# Patient Record
Sex: MALE | Race: WHITE | Employment: FULL TIME | URBAN - METROPOLITAN AREA
[De-identification: names, ages, dates, MRNs, and addresses within clinical notes are randomized per-mention and may not be internally consistent; named-entity substitution may affect disease eponyms.]

---

## 2021-02-18 ENCOUNTER — HOSPITAL ENCOUNTER (OUTPATIENT)
Dept: GENERAL RADIOLOGY | Age: 46
Discharge: HOME | End: 2021-02-20
Payer: COMMERCIAL

## 2021-02-18 ENCOUNTER — HOSPITAL ENCOUNTER (OUTPATIENT)
Age: 46
Discharge: HOME | End: 2021-02-20
Payer: COMMERCIAL

## 2021-02-18 DIAGNOSIS — M25.551 RIGHT HIP PAIN: ICD-10-CM

## 2021-02-18 PROCEDURE — 73552 X-RAY EXAM OF FEMUR 2/>: CPT

## 2021-02-18 PROCEDURE — 73502 X-RAY EXAM HIP UNI 2-3 VIEWS: CPT

## 2021-10-11 ENCOUNTER — OFFICE VISIT (OUTPATIENT)
Dept: ENT CLINIC | Age: 46
End: 2021-10-11
Payer: COMMERCIAL

## 2021-10-11 VITALS — WEIGHT: 222 LBS | HEIGHT: 70 IN | BODY MASS INDEX: 31.78 KG/M2

## 2021-10-11 DIAGNOSIS — Q18.0 CONGENITAL BRANCHIAL CLEFT CYST: Primary | ICD-10-CM

## 2021-10-11 PROCEDURE — 99204 OFFICE O/P NEW MOD 45 MIN: CPT | Performed by: OTOLARYNGOLOGY

## 2021-10-11 RX ORDER — ATORVASTATIN CALCIUM 40 MG/1
TABLET, FILM COATED ORAL
COMMUNITY
Start: 2021-09-24

## 2021-10-11 RX ORDER — METOPROLOL SUCCINATE 50 MG/1
TABLET, EXTENDED RELEASE ORAL
COMMUNITY
Start: 2021-09-01

## 2021-10-11 ASSESSMENT — ENCOUNTER SYMPTOMS
SORE THROAT: 0
VOMITING: 0
CHOKING: 0
TROUBLE SWALLOWING: 0
VOICE CHANGE: 0
SHORTNESS OF BREATH: 0
COUGH: 0

## 2021-10-11 NOTE — PATIENT INSTRUCTIONS
Please obtain CT disk from [facility]. You may bring with you the day of surgery or drop it off at the office. Due to the volume of surgeries at our practice, please allow the surgery scheduler up to 2 weeks to call you to schedule surgery. SURGERY:_____/_____/_____    Nothing to eat or drink after midnight the night before surgery unless surgery is at [facility] or otherwise instructed by the hospital.    DO NOT TAKE ANY ASPIRIN PRODUCTS 7 days prior to surgery. Tylenol only. No Advil, Motrin, Aleve, or Ibuprofen. Any illegal drugs in your system (including Marijuana even if legally prescribed) will result in your surgery being cancelled. Please be sure to check with our office or the hospital on time frame for the drugs to be out of your system. SHOULD YOUR INSURANCE CHANGE AT ANY TIME YOU MUST CONTACT OUR OFFICE. FAILURE TO DO SO MAY RESULT IN YOUR SURGERY BEING RESCHEDULED OR YOU MAY BE CHARGED AS SELF-PAY. Due to the high demand for surgery at our practice, if you cancel or reschedule your surgery two (2) times we may not reschedule you. If you need FMLA or Short Term Disability paperwork completed for your surgery, please complete your portion, ensure your name and date of birth are on them and fax them to [phone] asap. Paperwork can take up to 2 weeks to be completed. Per current hospital protocols, you will be contacted within 1 week of your surgery date with instructions to complete COVID-19 testing. COVID testing is no longer required as long as you are FULLY vaccinated (14 days AFTER 2nd vaccination). You must present your vaccination card at time of surgery, failure to do so will prompt a rapid COVID test prior to your surgery. If you need medical clearance, you are responsible to contact your physician(s) to schedule the appointment. If clearance is not completed within 30 days of your surgery it may be cancelled.  Our office will fax the appropriate forms that need to be completed to your physician(s). The location of your surgery will call you the day prior to your surgery date to let you know what time you have to be there and any other details.     ·       [address] , [facility] will call you a couple days prior to surgery and give you further instructions, if you have any questions, you can reach them at [phone]    ·       [address], [address], [facility], [facility] will call you a couple days prior to surgery and give you further instructions, if you have any questions, you can reach them at [phone]

## 2021-10-11 NOTE — PROGRESS NOTES
[address] [facility] Otolaryngology  Dr. Cindi Ott. ALEXANDRE Brown Ms.Ed. New Consult       Patient Name:  Gege Cordero  :  1975     CHIEF C/O:    Chief Complaint   Patient presents with    New Patient     patient was seen at [facility] ED and there is a lump in his throat, states there is no choking, coughing, or issues swallowing. states has been there apx 10 years        HISTORY OBTAINED FROM:  patient    HISTORY OF PRESENT ILLNESS:       Sandy Gutierrez is a 42y.o. year old male, here today for left sided neck mass evaluated by pcp had ct at [facility] for here for evaluation. Patient denies any significant changes recently, has noticed it for several years greater than 10, but is become slightly enlarged recently, no overlying redness no overlying difficulty swallowing. No current complaints of voice change shortness of breath or stridor. No other new complaints today of nasal congestion epistaxis fever chills sore throat no prior history of any neck surgeries. No other complaint today of tinnitus vertigo or hearing loss. Past Medical History:   Diagnosis Date    Arthralgia of hip     congenital hip issues    Family history of cardiovascular disease     Hyperlipidemia     Hypertension      Past Surgical History:   Procedure Laterality Date    HIP SURGERY Left     resurfacing- [facility]    TYMPANOSTOMY TUBE PLACEMENT      WISDOM TOOTH EXTRACTION         Current Outpatient Medications:     metoprolol succinate (TOPROL XL) 50 MG extended release tablet, TAKE 1 TABLET BY MOUTH EVERY DAY, Disp: , Rfl:     atorvastatin (LIPITOR) 40 MG tablet, TAKE 1 TABLET BY MOUTH EVERY DAY, Disp: , Rfl:     diltiazem (CARDIZEM CD) 180 MG extended release capsule, Take 1 capsule by mouth daily, Disp: 90 capsule, Rfl: 3    clotrimazole-betamethasone (LOTRISONE) 1-0.05 % cream, Apply topically 2 times daily.  (Patient not taking: Reported on 10/11/2021), Disp: 45 g, Rfl: 0    diltiazem (CARDIZEM CD) 180 MG ER capsule, Take 1 capsule by mouth daily, Disp: 30 capsule, Rfl: 3  Ace inhibitors  Social History     Tobacco Use    Smoking status: Never Smoker    Smokeless tobacco: Never Used    Tobacco comment: caffeine-  cups daily   Substance Use Topics    Alcohol use: Yes     Comment: occ.  Drug use: No     Family History   Problem Relation Age of Onset    Diabetes Mother     Stroke Mother     Diabetes Father        Review of Systems   Constitutional: Negative for chills and fever. HENT: Negative for ear discharge, hearing loss, sore throat, trouble swallowing and voice change. Respiratory: Negative for cough, choking and shortness of breath. Cardiovascular: Negative for chest pain and palpitations. Gastrointestinal: Negative for vomiting. Skin: Negative for rash. Allergic/Immunologic: Negative for environmental allergies. Neurological: Negative for dizziness and headaches. Hematological: Does not bruise/bleed easily. All other systems reviewed and are negative. Ht 5' 10\" (1.778 m)   Wt 222 lb (100.7 kg)   BMI 31.85 kg/m²   Physical Exam  Vitals and nursing note reviewed. Constitutional:       Appearance: He is well-developed. HENT:      Head: No contusion, masses or laceration. Jaw: No tenderness or swelling. Right Ear: Tympanic membrane, ear canal and external ear normal. No decreased hearing noted. No drainage. There is no impacted cerumen. Left Ear: Tympanic membrane, ear canal and external ear normal. No decreased hearing noted. No drainage. There is no impacted cerumen. Nose: Nose normal. No septal deviation, congestion or rhinorrhea. Right Nostril: No epistaxis. Left Nostril: No epistaxis. Right Turbinates: Not enlarged. Left Turbinates: Not enlarged. Mouth/Throat:      Mouth: Mucous membranes are moist. No oral lesions. Dentition: No gum lesions. Tongue: No lesions.       Pharynx: No oropharyngeal exudate or posterior oropharyngeal erythema. Eyes:      Pupils: Pupils are equal, round, and reactive to light. Neck:      Thyroid: No thyromegaly. Trachea: No tracheal deviation. Cardiovascular:      Rate and Rhythm: Normal rate. Pulmonary:      Effort: Pulmonary effort is normal. No respiratory distress. Musculoskeletal:         General: Normal range of motion. Cervical back: Normal range of motion. Lymphadenopathy:      Cervical: No cervical adenopathy. Skin:     General: Skin is warm. Findings: No erythema. Neurological:      Mental Status: He is alert. Cranial Nerves: No cranial nerve deficit. 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 Risk include infection scarring need for future surgery bleeding as well. Follow up 1 week post op    Dr. Baron Mimi Florse Otolaryngology/Facial Plastic Surgery Residency  Associate Clinical Professor:  Wyona Severe, [facility]

## 2021-10-14 ENCOUNTER — TELEPHONE (OUTPATIENT)
Dept: ENT CLINIC | Age: 46
End: 2021-10-14

## 2021-10-21 ENCOUNTER — TELEPHONE (OUTPATIENT)
Dept: ENT CLINIC | Age: 46
End: 2021-10-21

## 2021-12-23 ENCOUNTER — HOSPITAL ENCOUNTER (OUTPATIENT)
Dept: PREADMISSION TESTING | Age: 46
Discharge: HOME | End: 2021-12-23
Payer: COMMERCIAL

## 2021-12-23 LAB
ANION GAP SERPL CALCULATED.3IONS-SCNC: 12 MMOL/L (ref 7–16)
BUN BLDV-MCNC: 15 MG/DL (ref 6–20)
CALCIUM SERPL-MCNC: 9.5 MG/DL (ref 8.6–10.2)
CHLORIDE BLD-SCNC: 102 MMOL/L (ref 98–107)
CO2: 25 MMOL/L (ref 22–29)
CREAT SERPL-MCNC: 0.9 MG/DL (ref 0.7–1.2)
EKG ATRIAL RATE: 67 BPM
EKG P AXIS: 20 DEGREES
EKG P-R INTERVAL: 152 MS
EKG Q-T INTERVAL: 408 MS
EKG QRS DURATION: 102 MS
EKG QTC CALCULATION (BAZETT): 431 MS
EKG R AXIS: 35 DEGREES
EKG T AXIS: -8 DEGREES
EKG VENTRICULAR RATE: 67 BPM
GFR AFRICAN AMERICAN: >60
GFR NON-AFRICAN AMERICAN: >60 ML/MIN/1.73
GLUCOSE BLD-MCNC: 113 MG/DL (ref 74–99)
POTASSIUM SERPL-SCNC: 4.3 MMOL/L (ref 3.5–5)
SODIUM BLD-SCNC: 139 MMOL/L (ref 132–146)

## 2021-12-23 PROCEDURE — 80048 BASIC METABOLIC PNL TOTAL CA: CPT

## 2021-12-23 PROCEDURE — 93005 ELECTROCARDIOGRAM TRACING: CPT

## 2021-12-23 PROCEDURE — 36415 COLL VENOUS BLD VENIPUNCTURE: CPT

## 2021-12-23 RX ORDER — OLMESARTAN MEDOXOMIL AND HYDROCHLOROTHIAZIDE 20/12.5 20; 12.5 MG/1; MG/1
TABLET ORAL
COMMUNITY
Start: 2021-03-17

## 2021-12-23 RX ORDER — ACETAMINOPHEN 500 MG
1000 TABLET ORAL EVERY 8 HOURS
COMMUNITY
Start: 2021-08-20

## 2021-12-23 NOTE — PROGRESS NOTES
[facility] PRE-ADMISSION TESTING INSTRUCTIONS    The Preadmission Testing patient is instructed accordingly using the following criteria (check applicable):    ARRIVAL INSTRUCTIONS:  [x] Parking the day of Surgery is located in the Main Entrance lot. Upon entering the door, make an immediate right to the surgery reception desk    [x] Bring photo ID and insurance card    [] Bring in a copy of Living will or Durable Power of  papers. [x] Please be sure to arrange for responsible adult to provide transportation to and from the hospital    [x] Please arrange for responsible adult to be with you for the 24 hour period post procedure due to having anesthesia      GENERAL INSTRUCTIONS:    [x] Nothing by mouth after midnight, including gum, candy, mints or water    [x] You may brush your teeth, but do not swallow any water    [x] Take medications as instructed with 1-2 oz of water    [] Stop herbal supplements and vitamins 5 days prior to procedure    [] Follow preop dosing of blood thinners per physician instructions    [] Take 1/2 dose of evening insulin, but no insulin after midnight    [] No oral diabetic medications after midnight    [] If diabetic and have low blood sugar or feel symptomatic, take 1-2oz apple juice only    [] Bring inhalers day of surgery    [] Bring C-PAP/ Bi-Pap day of surgery    [] Bring urine specimen day of surgery    [x] Shower or bath with soap, lather and rinse well, AM of Surgery, no lotion, powders or creams to surgical site    [] Follow bowel prep as instructed per surgeon    [] No tobacco products within 24 hours of surgery     [] No alcohol or illegal drug use within 24 hours of surgery.     [x] Jewelry, body piercing's, eyeglasses, contact lenses and dentures are not permitted into surgery (bring cases)      [] Please do not wear any nail polish, make up or hair products on the day of surgery    [x] You can expect a call the business day prior to procedure to notify you if your arrival time changes    [x] If you receive a survey after surgery we would greatly appreciate your comments    [] Parent/guardian of a minor must accompany their child and remain on the premises  the entire time they are under our care     [] Pediatric patients may bring favorite toy, blanket or comfort item with them    [] A caregiver or family member must remain with the patient during their stay if they are mentally handicapped, have dementia, disoriented or unable to use a call light or would be a safety concern if left unattended    [x] Please notify surgeon if you develop any illness between now and time of surgery (cold, cough, sore throat, fever, nausea, vomiting) or any signs of infections  including skin, wounds, and dental.    [x]  The Outpatient Pharmacy is available to fill your prescription here on your day of surgery, ask your preop nurse for details    [] Other instructions    EDUCATIONAL MATERIALS PROVIDED:    [] PAT Preoperative Education Packet/Booklet     [] Medication List    [] Transfusion bracelet applied with instructions    [] Shower with soap, lather and rinse well, and use CHG wipes provided the evening before surgery as instructed    [] Incentive spirometer with instructions        Have you been tested for COVID  No           Have you been told you were positive for COVID No  Have you had any known exposure to someone that is positive for COVID No  Do you have a cough                   No              Do you have shortness of breath No                 Do you have a sore throat            No                Are you having chills                    No                Are you having muscle aches. No                    Please come to the hospital wearing a mask and have your significant other wear a mask as well. Both of you should check your temperature before leaving to come here,  if it is 100 or higher please call [phone] for instruction.

## 2021-12-26 NOTE — H&P
Patient Name:  Colby Cabrales  :  1975      CHIEF C/O:         Chief Complaint   Patient presents with    New Patient       patient was seen at [facility] and there is a lump in his throat, states there is no choking, coughing, or issues swallowing. states has been there apx 10 years          HISTORY OBTAINED FROM:  patient     HISTORY OF PRESENT ILLNESS:       Rosetta Runner is a 42y.o. year old male, here today for left sided neck mass evaluated by pcp had ct at [facility] for here for evaluation. Patient denies any significant changes recently, has noticed it for several years greater than 10, but is become slightly enlarged recently, no overlying redness no overlying difficulty swallowing. No current complaints of voice change shortness of breath or stridor. No other new complaints today of nasal congestion epistaxis fever chills sore throat no prior history of any neck surgeries. No other complaint today of tinnitus vertigo or hearing loss.     Past Medical History        Past Medical History:   Diagnosis Date    Arthralgia of hip       congenital hip issues    Family history of cardiovascular disease      Hyperlipidemia      Hypertension           Past Surgical History         Past Surgical History:   Procedure Laterality Date    HIP SURGERY Left      resurfacing- [facility]    TYMPANOSTOMY TUBE PLACEMENT        WISDOM TOOTH EXTRACTION               Current Medication      Current Outpatient Medications:     metoprolol succinate (TOPROL XL) 50 MG extended release tablet, TAKE 1 TABLET BY MOUTH EVERY DAY, Disp: , Rfl:     atorvastatin (LIPITOR) 40 MG tablet, TAKE 1 TABLET BY MOUTH EVERY DAY, Disp: , Rfl:     diltiazem (CARDIZEM CD) 180 MG extended release capsule, Take 1 capsule by mouth daily, Disp: 90 capsule, Rfl: 3    clotrimazole-betamethasone (LOTRISONE) 1-0.05 % cream, Apply topically 2 times daily.  (Patient not taking: Reported on 10/11/2021), Disp: 45 g, Rfl: 0   diltiazem (CARDIZEM CD) 180 MG ER capsule, Take 1 capsule by mouth daily, Disp: 30 capsule, Rfl: 3     Ace inhibitors  Social History            Tobacco Use    Smoking status: Never Smoker    Smokeless tobacco: Never Used    Tobacco comment: caffeine-  cups daily   Substance Use Topics    Alcohol use: Yes       Comment: occ.  Drug use: No      Family History         Family History   Problem Relation Age of Onset    Diabetes Mother      Stroke Mother      Diabetes Father              Review of Systems   Constitutional: Negative for chills and fever. HENT: Negative for ear discharge, hearing loss, sore throat, trouble swallowing and voice change. Respiratory: Negative for cough, choking and shortness of breath. Cardiovascular: Negative for chest pain and palpitations. Gastrointestinal: Negative for vomiting. Skin: Negative for rash. Allergic/Immunologic: Negative for environmental allergies. Neurological: Negative for dizziness and headaches. Hematological: Does not bruise/bleed easily. All other systems reviewed and are negative.        Ht 5' 10\" (1.778 m)   Wt 222 lb (100.7 kg)   BMI 31.85 kg/m²   Physical Exam  Vitals and nursing note reviewed. Constitutional:       Appearance: He is well-developed. HENT:      Head: No contusion, masses or laceration. Jaw: No tenderness or swelling. Right Ear: Tympanic membrane, ear canal and external ear normal. No decreased hearing noted. No drainage. There is no impacted cerumen. Left Ear: Tympanic membrane, ear canal and external ear normal. No decreased hearing noted. No drainage. There is no impacted cerumen. Nose: Nose normal. No septal deviation, congestion or rhinorrhea. Right Nostril: No epistaxis. Left Nostril: No epistaxis. Right Turbinates: Not enlarged. Left Turbinates: Not enlarged. Mouth/Throat:      Mouth: Mucous membranes are moist. No oral lesions. Dentition: No gum lesions. Tongue: No lesions. Pharynx: No oropharyngeal exudate or posterior oropharyngeal erythema. Eyes:      Pupils: Pupils are equal, round, and reactive to light. Neck:      Thyroid: No thyromegaly. Trachea: No tracheal deviation. Cardiovascular:      Rate and Rhythm: Normal rate. Pulmonary:      Effort: Pulmonary effort is normal. No respiratory distress. Musculoskeletal:         General: Normal range of motion. Cervical back: Normal range of motion. Lymphadenopathy:      Cervical: No cervical adenopathy. Skin:     General: Skin is warm. Findings: No erythema. Neurological:      Mental Status: He is alert. Cranial Nerves: No cranial nerve deficit.          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 Risk include infection scarring need for future surgery bleeding as well.   Follow up 1 week post op    Electronically signed by Karen Gonzalez DO on 12/26/2021 at 9:47 AM

## 2021-12-28 ENCOUNTER — ANESTHESIA EVENT (OUTPATIENT)
Dept: OPERATING ROOM | Age: 46
End: 2021-12-28
Payer: COMMERCIAL

## 2021-12-28 ENCOUNTER — HOSPITAL ENCOUNTER (OUTPATIENT)
Age: 46
Setting detail: OUTPATIENT SURGERY
Discharge: HOME | End: 2021-12-28
Attending: OTOLARYNGOLOGY | Admitting: OTOLARYNGOLOGY
Payer: COMMERCIAL

## 2021-12-28 ENCOUNTER — ANESTHESIA (OUTPATIENT)
Dept: OPERATING ROOM | Age: 46
End: 2021-12-28
Payer: COMMERCIAL

## 2021-12-28 VITALS
BODY MASS INDEX: 37.22 KG/M2 | RESPIRATION RATE: 16 BRPM | WEIGHT: 260 LBS | HEART RATE: 86 BPM | HEIGHT: 70 IN | OXYGEN SATURATION: 93 % | DIASTOLIC BLOOD PRESSURE: 57 MMHG | TEMPERATURE: 98 F | SYSTOLIC BLOOD PRESSURE: 121 MMHG

## 2021-12-28 VITALS — DIASTOLIC BLOOD PRESSURE: 52 MMHG | SYSTOLIC BLOOD PRESSURE: 97 MMHG | TEMPERATURE: 66.6 F | OXYGEN SATURATION: 99 %

## 2021-12-28 DIAGNOSIS — G89.18 POST-OP PAIN: Primary | ICD-10-CM

## 2021-12-28 PROCEDURE — 7100000000 HC PACU RECOVERY - FIRST 15 MIN: Performed by: OTOLARYNGOLOGY

## 2021-12-28 PROCEDURE — 42815 EXCISION OF NECK CYST: CPT | Performed by: OTOLARYNGOLOGY

## 2021-12-28 PROCEDURE — 7100000001 HC PACU RECOVERY - ADDTL 15 MIN: Performed by: OTOLARYNGOLOGY

## 2021-12-28 PROCEDURE — 2580000003 HC RX 258: Performed by: NURSE ANESTHETIST, CERTIFIED REGISTERED

## 2021-12-28 PROCEDURE — 3600000002 HC SURGERY LEVEL 2 BASE: Performed by: OTOLARYNGOLOGY

## 2021-12-28 PROCEDURE — 2500000003 HC RX 250 WO HCPCS: Performed by: NURSE ANESTHETIST, CERTIFIED REGISTERED

## 2021-12-28 PROCEDURE — 7100000010 HC PHASE II RECOVERY - FIRST 15 MIN: Performed by: OTOLARYNGOLOGY

## 2021-12-28 PROCEDURE — 2500000003 HC RX 250 WO HCPCS: Performed by: OTOLARYNGOLOGY

## 2021-12-28 PROCEDURE — 6360000002 HC RX W HCPCS: Performed by: NURSE ANESTHETIST, CERTIFIED REGISTERED

## 2021-12-28 PROCEDURE — 3700000000 HC ANESTHESIA ATTENDED CARE: Performed by: OTOLARYNGOLOGY

## 2021-12-28 PROCEDURE — 2709999900 HC NON-CHARGEABLE SUPPLY: Performed by: OTOLARYNGOLOGY

## 2021-12-28 PROCEDURE — 2580000003 HC RX 258: Performed by: STUDENT IN AN ORGANIZED HEALTH CARE EDUCATION/TRAINING PROGRAM

## 2021-12-28 PROCEDURE — 88304 TISSUE EXAM BY PATHOLOGIST: CPT

## 2021-12-28 PROCEDURE — 3600000012 HC SURGERY LEVEL 2 ADDTL 15MIN: Performed by: OTOLARYNGOLOGY

## 2021-12-28 PROCEDURE — 6360000002 HC RX W HCPCS: Performed by: ANESTHESIOLOGY

## 2021-12-28 PROCEDURE — 7100000011 HC PHASE II RECOVERY - ADDTL 15 MIN: Performed by: OTOLARYNGOLOGY

## 2021-12-28 PROCEDURE — 6360000002 HC RX W HCPCS: Performed by: STUDENT IN AN ORGANIZED HEALTH CARE EDUCATION/TRAINING PROGRAM

## 2021-12-28 PROCEDURE — 3700000001 HC ADD 15 MINUTES (ANESTHESIA): Performed by: OTOLARYNGOLOGY

## 2021-12-28 RX ORDER — SODIUM CHLORIDE 0.9 % (FLUSH) 0.9 %
5-40 SYRINGE (ML) INJECTION PRN
Status: DISCONTINUED | OUTPATIENT
Start: 2021-12-28 | End: 2021-12-28 | Stop reason: HOSPADM

## 2021-12-28 RX ORDER — HYDROCODONE BITARTRATE AND ACETAMINOPHEN 5; 325 MG/1; MG/1
1 TABLET ORAL EVERY 6 HOURS PRN
Qty: 12 TABLET | Refills: 0 | Status: SHIPPED | OUTPATIENT
Start: 2021-12-28 | End: 2021-12-31

## 2021-12-28 RX ORDER — PROPOFOL 10 MG/ML
INJECTION, EMULSION INTRAVENOUS PRN
Status: DISCONTINUED | OUTPATIENT
Start: 2021-12-28 | End: 2021-12-28 | Stop reason: SDUPTHER

## 2021-12-28 RX ORDER — ROCURONIUM BROMIDE 10 MG/ML
INJECTION, SOLUTION INTRAVENOUS PRN
Status: DISCONTINUED | OUTPATIENT
Start: 2021-12-28 | End: 2021-12-28 | Stop reason: SDUPTHER

## 2021-12-28 RX ORDER — MEPERIDINE HYDROCHLORIDE 25 MG/ML
12.5 INJECTION INTRAMUSCULAR; INTRAVENOUS; SUBCUTANEOUS EVERY 5 MIN PRN
Status: DISCONTINUED | OUTPATIENT
Start: 2021-12-28 | End: 2021-12-28 | Stop reason: HOSPADM

## 2021-12-28 RX ORDER — SODIUM CHLORIDE, SODIUM LACTATE, POTASSIUM CHLORIDE, CALCIUM CHLORIDE 600; 310; 30; 20 MG/100ML; MG/100ML; MG/100ML; MG/100ML
INJECTION, SOLUTION INTRAVENOUS CONTINUOUS
Status: DISCONTINUED | OUTPATIENT
Start: 2021-12-28 | End: 2021-12-28 | Stop reason: HOSPADM

## 2021-12-28 RX ORDER — SODIUM CHLORIDE 0.9 % (FLUSH) 0.9 %
5-40 SYRINGE (ML) INJECTION EVERY 12 HOURS SCHEDULED
Status: DISCONTINUED | OUTPATIENT
Start: 2021-12-28 | End: 2021-12-28 | Stop reason: HOSPADM

## 2021-12-28 RX ORDER — PHENYLEPHRINE HCL IN 0.9% NACL 1 MG/10 ML
SYRINGE (ML) INTRAVENOUS PRN
Status: DISCONTINUED | OUTPATIENT
Start: 2021-12-28 | End: 2021-12-28 | Stop reason: SDUPTHER

## 2021-12-28 RX ORDER — FENTANYL CITRATE 50 UG/ML
25 INJECTION, SOLUTION INTRAMUSCULAR; INTRAVENOUS EVERY 5 MIN PRN
Status: DISCONTINUED | OUTPATIENT
Start: 2021-12-28 | End: 2021-12-28 | Stop reason: HOSPADM

## 2021-12-28 RX ORDER — FENTANYL CITRATE 50 UG/ML
50 INJECTION, SOLUTION INTRAMUSCULAR; INTRAVENOUS EVERY 5 MIN PRN
Status: DISCONTINUED | OUTPATIENT
Start: 2021-12-28 | End: 2021-12-28 | Stop reason: HOSPADM

## 2021-12-28 RX ORDER — MIDAZOLAM HYDROCHLORIDE 1 MG/ML
INJECTION INTRAMUSCULAR; INTRAVENOUS PRN
Status: DISCONTINUED | OUTPATIENT
Start: 2021-12-28 | End: 2021-12-28 | Stop reason: SDUPTHER

## 2021-12-28 RX ORDER — LIDOCAINE HYDROCHLORIDE 20 MG/ML
INJECTION, SOLUTION EPIDURAL; INFILTRATION; INTRACAUDAL; PERINEURAL PRN
Status: DISCONTINUED | OUTPATIENT
Start: 2021-12-28 | End: 2021-12-28 | Stop reason: SDUPTHER

## 2021-12-28 RX ORDER — DEXAMETHASONE SODIUM PHOSPHATE 4 MG/ML
INJECTION, SOLUTION INTRA-ARTICULAR; INTRALESIONAL; INTRAMUSCULAR; INTRAVENOUS; SOFT TISSUE PRN
Status: DISCONTINUED | OUTPATIENT
Start: 2021-12-28 | End: 2021-12-28 | Stop reason: SDUPTHER

## 2021-12-28 RX ORDER — SUCCINYLCHOLINE/SOD CL,ISO/PF 200MG/10ML
SYRINGE (ML) INTRAVENOUS PRN
Status: DISCONTINUED | OUTPATIENT
Start: 2021-12-28 | End: 2021-12-28 | Stop reason: SDUPTHER

## 2021-12-28 RX ORDER — LIDOCAINE HYDROCHLORIDE AND EPINEPHRINE 10; 10 MG/ML; UG/ML
INJECTION, SOLUTION INFILTRATION; PERINEURAL PRN
Status: DISCONTINUED | OUTPATIENT
Start: 2021-12-28 | End: 2021-12-28 | Stop reason: ALTCHOICE

## 2021-12-28 RX ORDER — CEPHALEXIN 500 MG/1
500 CAPSULE ORAL 3 TIMES DAILY
Qty: 21 CAPSULE | Refills: 0 | Status: SHIPPED | OUTPATIENT
Start: 2021-12-28 | End: 2022-01-04

## 2021-12-28 RX ORDER — FENTANYL CITRATE 50 UG/ML
INJECTION, SOLUTION INTRAMUSCULAR; INTRAVENOUS PRN
Status: DISCONTINUED | OUTPATIENT
Start: 2021-12-28 | End: 2021-12-28 | Stop reason: SDUPTHER

## 2021-12-28 RX ORDER — ONDANSETRON 2 MG/ML
INJECTION INTRAMUSCULAR; INTRAVENOUS PRN
Status: DISCONTINUED | OUTPATIENT
Start: 2021-12-28 | End: 2021-12-28 | Stop reason: SDUPTHER

## 2021-12-28 RX ORDER — ONDANSETRON 2 MG/ML
4 INJECTION INTRAMUSCULAR; INTRAVENOUS
Status: DISCONTINUED | OUTPATIENT
Start: 2021-12-28 | End: 2021-12-28 | Stop reason: HOSPADM

## 2021-12-28 RX ORDER — SODIUM CHLORIDE 9 MG/ML
INJECTION, SOLUTION INTRAVENOUS CONTINUOUS PRN
Status: DISCONTINUED | OUTPATIENT
Start: 2021-12-28 | End: 2021-12-28 | Stop reason: SDUPTHER

## 2021-12-28 RX ORDER — SODIUM CHLORIDE 9 MG/ML
25 INJECTION, SOLUTION INTRAVENOUS PRN
Status: DISCONTINUED | OUTPATIENT
Start: 2021-12-28 | End: 2021-12-28 | Stop reason: HOSPADM

## 2021-12-28 RX ADMIN — MIDAZOLAM 2 MG: 1 INJECTION INTRAMUSCULAR; INTRAVENOUS at 07:17

## 2021-12-28 RX ADMIN — FENTANYL CITRATE 50 MCG: 50 INJECTION INTRAMUSCULAR; INTRAVENOUS at 09:25

## 2021-12-28 RX ADMIN — ROCURONIUM BROMIDE 10 MG: 10 INJECTION, SOLUTION INTRAVENOUS at 07:30

## 2021-12-28 RX ADMIN — FENTANYL CITRATE 100 MCG: 50 INJECTION, SOLUTION INTRAMUSCULAR; INTRAVENOUS at 07:30

## 2021-12-28 RX ADMIN — PROPOFOL 200 MG: 10 INJECTION, EMULSION INTRAVENOUS at 07:30

## 2021-12-28 RX ADMIN — SODIUM CHLORIDE: 9 INJECTION, SOLUTION INTRAVENOUS at 06:30

## 2021-12-28 RX ADMIN — Medication 100 MCG: at 08:32

## 2021-12-28 RX ADMIN — FENTANYL CITRATE 100 MCG: 50 INJECTION, SOLUTION INTRAMUSCULAR; INTRAVENOUS at 07:57

## 2021-12-28 RX ADMIN — Medication 100 MCG: at 08:05

## 2021-12-28 RX ADMIN — DEXAMETHASONE SODIUM PHOSPHATE 10 MG: 4 INJECTION, SOLUTION INTRAMUSCULAR; INTRAVENOUS at 07:46

## 2021-12-28 RX ADMIN — Medication 2000 MG: at 07:17

## 2021-12-28 RX ADMIN — Medication 100 MCG: at 08:12

## 2021-12-28 RX ADMIN — LIDOCAINE HYDROCHLORIDE 100 MG: 20 INJECTION, SOLUTION EPIDURAL; INFILTRATION; INTRACAUDAL; PERINEURAL at 07:30

## 2021-12-28 RX ADMIN — Medication 100 MCG: at 08:28

## 2021-12-28 RX ADMIN — Medication 180 MG: at 07:30

## 2021-12-28 RX ADMIN — Medication 100 MCG: at 08:02

## 2021-12-28 RX ADMIN — ONDANSETRON 4 MG: 2 INJECTION INTRAMUSCULAR; INTRAVENOUS at 07:46

## 2021-12-28 RX ADMIN — FENTANYL CITRATE 50 MCG: 50 INJECTION INTRAMUSCULAR; INTRAVENOUS at 09:21

## 2021-12-28 RX ADMIN — SODIUM CHLORIDE, POTASSIUM CHLORIDE, SODIUM LACTATE AND CALCIUM CHLORIDE: 600; 310; 30; 20 INJECTION, SOLUTION INTRAVENOUS at 08:36

## 2021-12-28 ASSESSMENT — PULMONARY FUNCTION TESTS
PIF_VALUE: 22
PIF_VALUE: 24
PIF_VALUE: 24
PIF_VALUE: 25
PIF_VALUE: 24
PIF_VALUE: 25
PIF_VALUE: 24
PIF_VALUE: 25
PIF_VALUE: 24
PIF_VALUE: 27
PIF_VALUE: 24
PIF_VALUE: 1
PIF_VALUE: 23
PIF_VALUE: 24
PIF_VALUE: 22
PIF_VALUE: 29
PIF_VALUE: 17
PIF_VALUE: 25
PIF_VALUE: 24
PIF_VALUE: 1
PIF_VALUE: 24
PIF_VALUE: 1
PIF_VALUE: 21
PIF_VALUE: 24
PIF_VALUE: 23
PIF_VALUE: 23
PIF_VALUE: 24
PIF_VALUE: 24
PIF_VALUE: 18
PIF_VALUE: 32
PIF_VALUE: 24
PIF_VALUE: 1
PIF_VALUE: 22
PIF_VALUE: 1
PIF_VALUE: 18
PIF_VALUE: 27
PIF_VALUE: 25
PIF_VALUE: 23
PIF_VALUE: 25
PIF_VALUE: 23
PIF_VALUE: 22
PIF_VALUE: 22
PIF_VALUE: 24
PIF_VALUE: 24
PIF_VALUE: 3
PIF_VALUE: 24
PIF_VALUE: 26
PIF_VALUE: 22
PIF_VALUE: 22
PIF_VALUE: 25
PIF_VALUE: 23
PIF_VALUE: 20
PIF_VALUE: 1
PIF_VALUE: 23
PIF_VALUE: 23
PIF_VALUE: 24
PIF_VALUE: 2
PIF_VALUE: 25
PIF_VALUE: 16
PIF_VALUE: 23
PIF_VALUE: 22
PIF_VALUE: 25
PIF_VALUE: 23
PIF_VALUE: 1
PIF_VALUE: 24
PIF_VALUE: 1
PIF_VALUE: 1
PIF_VALUE: 24
PIF_VALUE: 23
PIF_VALUE: 24
PIF_VALUE: 24
PIF_VALUE: 4
PIF_VALUE: 24
PIF_VALUE: 2
PIF_VALUE: 19
PIF_VALUE: 14
PIF_VALUE: 27
PIF_VALUE: 24
PIF_VALUE: 22
PIF_VALUE: 24
PIF_VALUE: 26
PIF_VALUE: 21
PIF_VALUE: 24
PIF_VALUE: 22
PIF_VALUE: 3
PIF_VALUE: 1
PIF_VALUE: 19
PIF_VALUE: 24

## 2021-12-28 ASSESSMENT — PAIN SCALES - GENERAL
PAINLEVEL_OUTOF10: 0
PAINLEVEL_OUTOF10: 7
PAINLEVEL_OUTOF10: 7
PAINLEVEL_OUTOF10: 0
PAINLEVEL_OUTOF10: 0

## 2021-12-28 NOTE — BRIEF OP NOTE
Brief Postoperative Note      Patient: Estefany Young  YOB: 1975  MRN: [id number]    Date of Procedure: 12/28/2021    Pre-Op Diagnosis: BRANCHIAL CLEFT CYST    Post-Op Diagnosis: Same       Procedure(s):  EXCISION DEEP NECK MASS LEFT    Surgeon(s):  Gertrude Vazquez DO    Assistant:  Resident: Dar Roberts DO; Shaun Vela DO    Anesthesia: General    Estimated Blood Loss (mL): Minimal    Complications: None    Specimens:   ID Type Source Tests Collected by Time Destination   A : Left neck mass Tissue Tissue SURGICAL PATHOLOGY Gertrude Vazquez DO 12/28/2021 0939        Implants:  * No implants in log *      Drains: * No LDAs found *    Findings: left cm cyst adjacent to the superior aspect of thyroid cartilage tracking to the thyrohyoid membrane    Electronically signed by Dar Roberts DO on 12/28/2021 at 8:46 AM

## 2021-12-28 NOTE — H&P
This is my H&P update. There will not be another H&P update, you may take the patient back to the operating room if you have read this. I have seen and examined the patient, I reviewed the H&P, there are no new changes. This is the end of the H&P update      Dr. Lea Greenwood.  Otolaryngology Facial Plastic Surgery  : [facility] Otolaryngology/Facial Plastic Surgery Residency    Associate Clinical Professor: Duncan Castillo, [facility]

## 2021-12-28 NOTE — ANESTHESIA POSTPROCEDURE EVALUATION
Department of Anesthesiology  Postprocedure Note    Patient: Surjit Joradn  MRN: [id number]  YOB: 1975  Date of evaluation: 12/28/2021  Time:  9:05 AM     Procedure Summary     Date: 12/28/21 Room / Location: [facility] 01 / 106 [facility]    Anesthesia Start: 1112 Anesthesia Stop: 5800    Procedure: EXCISION DEEP NECK MASS LEFT (Left Neck) Diagnosis: (BRANCHIAL CLEFT)    Surgeons: Rosa Lewis DO Responsible Provider: Alys Schlatter, MD    Anesthesia Type: general ASA Status: 2          Anesthesia Type: general    Billie Phase I: Billie Score: 10    Billie Phase II:      Last vitals: Reviewed and per EMR flowsheets.        Anesthesia Post Evaluation    Patient location during evaluation: PACU  Patient participation: complete - patient participated  Level of consciousness: awake  Airway patency: patent  Nausea & Vomiting: no nausea and no vomiting  Complications: no  Cardiovascular status: hemodynamically stable  Respiratory status: acceptable  Hydration status: euvolemic

## 2021-12-28 NOTE — ANESTHESIA PRE PROCEDURE
Department of Anesthesiology  Preprocedure Note       Name:  Love Vo   Age:  42 y.o.  :  1975                                          MRN:  [id number]         Date:  2021      Surgeon: Lexus Guajardo):  Cheryle Agee, DO    Procedure: Procedure(s):  EXCISION DEEP NECK MASS LEFT    Medications prior to admission:   Prior to Admission medications    Medication Sig Start Date End Date Taking? Authorizing Provider   olmesartan-hydroCHLOROthiazide (BENICAR HCT) 20-12.5 MG per tablet TAKE 1 TABLET BY MOUTH EVERY DAY 3/17/21  Yes Historical Provider, MD   acetaminophen (TYLENOL) 500 MG tablet Take 1,000 mg by mouth every 8 hours 21  Yes Historical Provider, MD   metoprolol succinate (TOPROL XL) 50 MG extended release tablet TAKE 1 TABLET BY MOUTH EVERY DAY 21  Yes Historical Provider, MD   atorvastatin (LIPITOR) 40 MG tablet TAKE 1 TABLET BY MOUTH EVERY DAY 21  Yes Historical Provider, MD       Current medications:    Current Facility-Administered Medications   Medication Dose Route Frequency Provider Last Rate Last Admin    lactated ringers infusion   IntraVENous Continuous Omer Paz, DO   New Bag at 21 0630    sodium chloride flush 0.9 % injection 5-40 mL  5-40 mL IntraVENous 2 times per day Omer Paz, DO        sodium chloride flush 0.9 % injection 5-40 mL  5-40 mL IntraVENous PRN Omer Paz, DO        0.9 % sodium chloride infusion  25 mL IntraVENous PRN Omer Paz, DO        ceFAZolin (ANCEF) 2000 mg in sterile water 20 mL IV syringe  2,000 mg IntraVENous On Call to Yue [address], DO           Allergies:     Allergies   Allergen Reactions    Ace Inhibitors Swelling       Problem List:    Patient Active Problem List   Diagnosis Code    Hyperlipidemia E78.5    Essential hypertension I10    Hyperglycemia R73.9    Ringworm B35.9       Past Medical History:        Diagnosis Date    Arthralgia of hip     congenital hip issues    Hyperlipidemia  Hypertension        Past Surgical History:        Procedure Laterality Date    HIP SURGERY Left 2014    resurfacing- [facility]    HIP SURGERY Right 08/19/2021    hip re-surfacing [facility]    KNEE SURGERY Bilateral 2017    pat[facility] -[facility]em[facility]    TYMPANOSTOMY TUBE PLACEMENT      WISDOM TOOTH EXTRACTION         Social History:    Social History     Tobacco Use    Smoking status: Never Smoker    Smokeless tobacco: Never Used    Tobacco comment: caffeine-  cups daily   Substance Use Topics    Alcohol use: Yes     Comment: occ                                Counseling given: Not Answered  Comment: caffeine-  cups daily      Vital Signs (Current):   Vitals:    12/23/21 0846 12/28/21 0602 12/28/21 0610   BP:   (!) 146/72   Pulse:   70   Resp:   18   Temp:   97 °F (36.1 °C)   TempSrc:   Temporal   SpO2:   94%   Weight: 260 lb (117.9 kg) 260 lb (117.9 kg)    Height: 5' 10\" (1.778 m) 5' 10\" (1.778 m)                                               BP Readings from Last 3 Encounters:   12/28/21 (!) 146/72   12/14/16 120/90   06/14/16 127/83       NPO Status: Time of last liquid consumption: 2230                        Time of last solid consumption: 2230                        Date of last liquid consumption: 12/27/21                        Date of last solid food consumption: 12/27/21    BMI:   Wt Readings from Last 3 Encounters:   12/28/21 260 lb (117.9 kg)   10/11/21 222 lb (100.7 kg)   12/14/16 222 lb 14.4 oz (101.1 kg)     Body mass index is 37.31 kg/m².     CBC:   Lab Results   Component Value Date    WBC 6.8 12/14/2016    WBC 11.3 03/11/2011    RBC 5.53 12/14/2016    HGB 15.6 12/14/2016    HCT 47.9 12/14/2016    MCV 86.7 12/14/2016    RDW 13.6 12/14/2016     12/14/2016       CMP:   Lab Results   Component Value Date     12/23/2021    K 4.3 12/23/2021     12/23/2021    CO2 25 12/23/2021    BUN 15 12/23/2021    CREATININE 0.9 12/23/2021    GFRAA >60 12/23/2021    AGRATIO 1.4 11/09/2015 LABGLOM >60 12/23/2021    GLUCOSE 113 12/23/2021    GLUCOSE 122 03/11/2011    PROT 8.0 12/14/2016    CALCIUM 9.5 12/23/2021    BILITOT 0.6 12/14/2016    ALKPHOS 74 12/14/2016    AST 22 12/14/2016    ALT 27 12/14/2016       POC Tests: No results for input(s): POCGLU, POCNA, POCK, POCCL, POCBUN, POCHEMO, POCHCT in the last 72 hours. Coags: No results found for: PROTIME, INR, APTT    HCG (If Applicable): No results found for: PREGTESTUR, PREGSERUM, HCG, HCGQUANT     ABGs: No results found for: PHART, PO2ART, KOA0BTQ, FOV3FUY, BEART, D1BQNVMY     Type & Screen (If Applicable):  No results found for: LABABO, LABRH    Drug/Infectious Status (If Applicable):  No results found for: HIV, HEPCAB    COVID-19 Screening (If Applicable): No results found for: COVID19        Anesthesia Evaluation  Patient summary reviewed  Airway: Mallampati: III  TM distance: >3 FB   Neck ROM: full  Mouth opening: > = 3 FB Dental:          Pulmonary:Negative Pulmonary ROS breath sounds clear to auscultation                             Cardiovascular:    (+) hypertension:, hyperlipidemia        Rhythm: regular  Rate: normal           Beta Blocker:  Dose within 24 Hrs         Neuro/Psych:   Negative Neuro/Psych ROS              GI/Hepatic/Renal: Neg GI/Hepatic/Renal ROS            Endo/Other:    (+) : arthritis:., .                 Abdominal:   (+) obese,     Abdomen: soft. Vascular: Other Findings:             Anesthesia Plan      general     ASA 2       Induction: intravenous. Anesthetic plan and risks discussed with patient.                   Patient seen and evaluated  CHANA Berrios - CRNA      LORNA Ng MD   12/28/2021

## 2021-12-29 NOTE — OP NOTE
[address] [address]                                OPERATIVE REPORT    PATIENT NAME: Marilyn Lan                    :        1975  MED REC NO:   [id number]                            ROOM:  ACCOUNT NO:   [de-identified]                           ADMIT DATE: 2021  PROVIDER:     Kaye Hirsch DO    DATE OF PROCEDURE:  2021    PREOPERATIVE DIAGNOSIS:  Branchial cleft cyst.    POSTOPERATIVE DIAGNOSIS:  Branchial cleft cyst.    PROCEDURE PERFORMED:  Excision of left deep neck mass. SURGEON:  Claudette Reinoso DO.    ASSISTANTS:  1.  Kaye Hirsch DO, PGY-3.  [address] [place] [address] , PGY-2. ANESTHESIA:  Endotracheal intubation. FLUIDS:  IV crystalloid. ESTIMATED BLOOD LOSS:  Minimal.    COMPLICATIONS:  None. SPECIMEN:  Left neck mass. FINDINGS:  Roughly 2 cm left deep neck cyst adjacent to the superior  aspect of the thyroid cartilage tracking towards the left side of the  thyrohyoid membrane. No obvious fistula. Consistent with Type III branchial cleft cyst    INDICATIONS FOR PROCEDURE:  This is a 47-year-old male with  multiple-year history of left neck mass that was observed by primary  care doctor. CT of the neck showed left neck cyst.  Risks, benefits,  and alternatives were discussed including, but not limited to bleeding,  infection, damage to adjacent structures, and damage to surrounding  nerves. The patient understood and agreed to proceed with the  procedure. DESCRIPTION OF PROCEDURE:  The patient was brought back to the operating  room and placed supine on the table. SCDs were placed and functioning. The patient was then handed to Anesthesia for proper endotracheal  intubation. Lidocaine 1% with epinephrine was then injected over the  projected left neck surgical incision. The patient was prepped and  draped in a sterile fashion.   Using a 15 blade scalpel, an incision was  made over the left neck and taken down to the platysmal layer. The  Checkpoint nerve monitoring system was then placed and noted to be  functioning. The Check point nerve monitor was used throughout the case. Each piece of tissue was stimulated prior to ligation. The marginal mandibular nerve on the left side was then  stimulated and retracted superiorly in the field. Careful dissection  was then carried down in the subplatysmal plane until the cyst in the  left neck was noted. Careful dissection around this cyst, which was  noted to have an intact wall, was then carried out. The cyst was noted  to be adjacent to the superior aspect of the thyroid cartilage tracking  towards the thyrohyoid membrane. Careful dissection was then further  continued around the cyst releasing all surrounding tissues until it was  freed from the deep tissue. Dissection was completed  using a Harmonic scalpel. The marginal mandibular nerve was then  Stimulated again and noted to be superior in the field out of the dissection  area. The left neck cyst was then removed from the surgical defect and  handed off the field for pathologic review. Minimal bleeding was seen. Hemostasis was achieved with bipolar cautery. The neck surgical  defect was then copiously irrigated with normal saline solution. The  deep dermal layer was closed with 3-0 Vicryl in a simple interrupted  fashion. The skin was closed with 4-0 Monocryl in a running  intracuticular fashion. Mastisol and Steri-Strips were then applied. The patient was handed back to Anesthesia for proper awakening. The  patient tolerated the procedure without complication. Dr. Eb Her was  present and scrubbed for the entirety of the case.         Juli Mcgovern DO    D: 12/28/2021 8:51:20       T: 12/28/2021 8:55:18     [id number]  Job#: [id number]     Doc#: [id number]    CC:

## 2022-01-03 ENCOUNTER — OFFICE VISIT (OUTPATIENT)
Dept: ENT CLINIC | Age: 47
End: 2022-01-03

## 2022-01-03 VITALS — BODY MASS INDEX: 37.22 KG/M2 | WEIGHT: 260 LBS | HEIGHT: 70 IN

## 2022-01-03 DIAGNOSIS — Q18.0 CONGENITAL BRANCHIAL CLEFT CYST: Primary | ICD-10-CM

## 2022-01-03 PROCEDURE — 99024 POSTOP FOLLOW-UP VISIT: CPT | Performed by: OTOLARYNGOLOGY

## 2022-01-03 ASSESSMENT — ENCOUNTER SYMPTOMS
CHOKING: 0
COUGH: 0
VOICE CHANGE: 0
SHORTNESS OF BREATH: 0
SORE THROAT: 0
VOMITING: 0
TROUBLE SWALLOWING: 0

## 2022-01-03 NOTE — PROGRESS NOTES
[facility]  Dr. Gloria Pisano. ALEXANDRE Brown Ms.Ed. New Consult       Patient Name:  Eliceo Dill  :  1975     CHIEF C/O:    Chief Complaint   Patient presents with    Post-Op Check     deep neck mass. states still some soreness and brusing . states no discharge or drianage since day of        HISTORY OBTAINED FROM:  patient    HISTORY OF PRESENT ILLNESS:       Mare Peters is a 42y.o. year old male, here today for left sided neck mass evaluated by pcp had ct [facility] for here for evaluation. Patient denies any significant changes recently, has noticed it for several years greater than 10, but is become slightly enlarged recently, no overlying redness no overlying difficulty swallowing. No current complaints of voice change shortness of breath or stridor. No other new complaints today of nasal congestion epistaxis fever chills sore throat no prior history of any neck surgeries. No other complaint today of tinnitus vertigo or hearing loss. 1/3/22:  Patient returns 1 week s/p left neck mass excision. No complaints. Doing well.      Past Medical History:   Diagnosis Date    Arthralgia of hip     congenital hip issues    Hyperlipidemia     Hypertension      Past Surgical History:   Procedure Laterality Date    HIP SURGERY Left     resurfacing- [facility]    HIP SURGERY Right 2021    hip re-surfacing [facility]    KNEE SURGERY Bilateral 2017    patella -femoral [facility]    NECK SURGERY Left 2021    EXCISION DEEP NECK MASS LEFT performed [facility]    TYMPANOSTOMY TUBE PLACEMENT      WISDOM TOOTH EXTRACTION         Current Outpatient Medications:     cephALEXin (KEFLEX) 500 MG capsule, Take 1 capsule by mouth 3 times daily for 7 days, Disp: 21 capsule, Rfl: 0    olmesartan-hydroCHLOROthiazide (BENICAR HCT) 20-12.5 MG per tablet, TAKE 1 TABLET BY MOUTH EVERY DAY, Disp: , Rfl:     acetaminophen (TYLENOL) 500 MG tablet, Take 1,000 mg by mouth every 8 hours, Disp: , Rfl:     metoprolol succinate (TOPROL XL) 50 MG extended release tablet, TAKE 1 TABLET BY MOUTH EVERY DAY, Disp: , Rfl:     atorvastatin (LIPITOR) 40 MG tablet, TAKE 1 TABLET BY MOUTH EVERY DAY, Disp: , Rfl:   Ace inhibitors  Social History     Tobacco Use    Smoking status: Never Smoker    Smokeless tobacco: Never Used    Tobacco comment: caffeine-  cups daily   Substance Use Topics    Alcohol use: Yes     Comment: occ    Drug use: No     Family History   Problem Relation Age of Onset    Diabetes Mother     Stroke Mother     Diabetes Father        Review of Systems   Constitutional: Negative for chills and fever. HENT: Negative for ear discharge, hearing loss, sore throat, trouble swallowing and voice change. Respiratory: Negative for cough, choking and shortness of breath. Cardiovascular: Negative for chest pain and palpitations. Gastrointestinal: Negative for vomiting. Skin: Negative for rash. Allergic/Immunologic: Negative for environmental allergies. Neurological: Negative for dizziness and headaches. Hematological: Does not bruise/bleed easily. All other systems reviewed and are negative. Ht 5' 10\" (1.778 m)   Wt 260 lb (117.9 kg)   BMI 37.31 kg/m²   Physical Exam  Vitals and nursing note reviewed. Constitutional:       Appearance: He is well-developed. HENT:      Head: No contusion, masses or laceration. Jaw: No tenderness or swelling. Right Ear: Tympanic membrane, ear canal and external ear normal. No decreased hearing noted. No drainage. There is no impacted cerumen. Left Ear: Tympanic membrane, ear canal and external ear normal. No decreased hearing noted. No drainage. There is no impacted cerumen. Nose: Nose normal. No septal deviation, congestion or rhinorrhea. Right Nostril: No epistaxis. Left Nostril: No epistaxis. Right Turbinates: Not enlarged. Left Turbinates: Not enlarged.       Mouth/Throat:      Mouth: Mucous membranes are moist. No oral lesions. Dentition: No gum lesions. Tongue: No lesions. Pharynx: No oropharyngeal exudate or posterior oropharyngeal erythema. Eyes:      Pupils: Pupils are equal, round, and reactive to light. Neck:      Thyroid: No thyromegaly. Trachea: No tracheal deviation. Comments: Well healing left neck excision with surrounding ecchymosis and edema. Cardiovascular:      Rate and Rhythm: Normal rate. Pulmonary:      Effort: Pulmonary effort is normal. No respiratory distress. Musculoskeletal:         General: Normal range of motion. Cervical back: Normal range of motion. Lymphadenopathy:      Cervical: No cervical adenopathy. Skin:     General: Skin is warm. Findings: No erythema. Neurological:      Mental Status: He is alert. Cranial Nerves: No cranial nerve deficit. IMPRESSION/PLAN:    Patient here 1 week post op s/p left neck mass excision. Suspect brachial cleft cyst.  Pathology pending. Recommend antibiotic ointment to incision site for next week. Will see patient in follow up in 6 weeks. Electronically signed by Stephanie Vázquez DO on 1/3/2022 at 7:51 AM            Crystal Kumari  1975      I have discussed the case, including pertinent history and exam findings with the resident. I have seen and examined the patient and the key elements of the encounter have been performed by me. I agree with the assessment, plan and orders as documented by the resident. Patient here for follow up of medical problems. Remainder of medical problems as per resident note.       [id number] Kurtis Smith DO  1/4/22

## 2022-01-03 NOTE — PATIENT INSTRUCTIONS
Antibiotic ointment twice daily for the next week then can start over the counter Maderma scar cream daily

## 2022-02-14 ENCOUNTER — OFFICE VISIT (OUTPATIENT)
Dept: ENT CLINIC | Age: 47
End: 2022-02-14
Payer: COMMERCIAL

## 2022-02-14 VITALS — WEIGHT: 260 LBS | HEIGHT: 70 IN | BODY MASS INDEX: 37.22 KG/M2

## 2022-02-14 DIAGNOSIS — Q18.0 CONGENITAL BRANCHIAL CLEFT CYST: Primary | ICD-10-CM

## 2022-02-14 PROCEDURE — 99212 OFFICE O/P EST SF 10 MIN: CPT | Performed by: OTOLARYNGOLOGY

## 2022-02-14 ASSESSMENT — ENCOUNTER SYMPTOMS
SORE THROAT: 0
CHOKING: 0
COUGH: 0
SHORTNESS OF BREATH: 0
VOMITING: 0
VOICE CHANGE: 0
TROUBLE SWALLOWING: 0

## 2022-02-14 NOTE — PROGRESS NOTES
[facility]  Dr. Lawrnce Oppenheim. ALEXANDRE Brown Ms.Ed. New Consult       Patient Name:  Douglas Bella  :  1975     CHIEF C/O:    Chief Complaint   Patient presents with    Post-Op Check     6 wk p/o excision neck mass       HISTORY OBTAINED FROM:  patient    HISTORY OF PRESENT ILLNESS:       Rosetta Runner is a 42y.o. year old male, here today for left sided neck mass evaluated by pcp had ct at [facility] for here for evaluation. Patient denies any significant changes recently, has noticed it for several years greater than 10, but is become slightly enlarged recently, no overlying redness no overlying difficulty swallowing. No current complaints of voice change shortness of breath or stridor. No other new complaints today of nasal congestion epistaxis fever chills sore throat no prior history of any neck surgeries. No other complaint today of tinnitus vertigo or hearing loss. 1/3/22:  Patient returns 1 week s/p left neck mass excision. No complaints. Doing well. 22: patient returns 6 weeks s/p left neck mass excision. No complaints. Doing well with some residual numbness to the area.      Past Medical History:   Diagnosis Date    Arthralgia of hip     congenital hip issues    Hyperlipidemia     Hypertension      Past Surgical History:   Procedure Laterality Date    HIP SURGERY Left     [facility]urfacing- [facility]    HIP SURGERY Right 2021    hip re[facility]surfacing [facility]    KNEE SURGERY Bilateral 2017    patella -femoral [facility]    NECK SURGERY Left 2021    EXCISION DEEP NECK MASS LEFT performed [facility] Ashley [facility]iles DO [facility] OR    TYMPANOSTOMY TUBE PLACEMENT      WISDOM TOOTH EXTRACTION         Current Outpatient Medications:     olmesartan-hydroCHLOROthiazide (BENICAR HCT) 20-12.5 MG per tablet, TAKE 1 TABLET BY MOUTH EVERY DAY, Disp: , Rfl:     acetaminophen (TYLENOL) 500 MG tablet, Take 1,000 mg by mouth every 8 hours, Disp: , Rfl:     metoprolol succinate (TOPROL XL) 50 MG extended release tablet, TAKE 1 TABLET BY MOUTH EVERY DAY, Disp: , Rfl:     atorvastatin (LIPITOR) 40 MG tablet, TAKE 1 TABLET BY MOUTH EVERY DAY, Disp: , Rfl:   Ace inhibitors  Social History     Tobacco Use    Smoking status: Never Smoker    Smokeless tobacco: Never Used    Tobacco comment: caffeine-  cups daily   Substance Use Topics    Alcohol use: Yes     Comment: occ    Drug use: No     Family History   Problem Relation Age of Onset    Diabetes Mother     Stroke Mother     Diabetes Father        Review of Systems   Constitutional: Negative for chills and fever. HENT: Negative for ear discharge, hearing loss, sore throat, trouble swallowing and voice change. Respiratory: Negative for cough, choking and shortness of breath. Cardiovascular: Negative for chest pain and palpitations. Gastrointestinal: Negative for vomiting. Skin: Negative for rash. Allergic/Immunologic: Negative for environmental allergies. Neurological: Negative for dizziness and headaches. Hematological: Does not bruise/bleed easily. All other systems reviewed and are negative. Ht 5' 10\" (1.778 m)   Wt 260 lb (117.9 kg)   BMI 37.31 kg/m²   Physical Exam  Vitals and nursing note reviewed. Constitutional:       Appearance: He is well-developed. HENT:      Head: No contusion, masses or laceration. Jaw: No tenderness or swelling. Right Ear: Tympanic membrane, ear canal and external ear normal. No decreased hearing noted. No drainage. There is no impacted cerumen. Left Ear: Tympanic membrane, ear canal and external ear normal. No decreased hearing noted. No drainage. There is no impacted cerumen. Nose: Nose normal. No septal deviation, congestion or rhinorrhea. Right Nostril: No epistaxis. Left Nostril: No epistaxis. Right Turbinates: Not enlarged. Left Turbinates: Not enlarged. Mouth/Throat:      Mouth: Mucous membranes are moist. No oral lesions. Dentition: No gum lesions. Tongue: No lesions. Pharynx: No oropharyngeal exudate or posterior oropharyngeal erythema. Eyes:      Pupils: Pupils are equal, round, and reactive to light. Neck:      Thyroid: No thyromegaly. Trachea: No tracheal deviation. Comments: Well healing left neck excision with surrounding ecchymosis and edema. Cardiovascular:      Rate and Rhythm: Normal rate. Pulmonary:      Effort: Pulmonary effort is normal. No respiratory distress. Musculoskeletal:         General: Normal range of motion. Cervical back: Normal range of motion. Lymphadenopathy:      Cervical: No cervical adenopathy. Skin:     General: Skin is warm. Findings: No erythema. Neurological:      Mental Status: He is alert. Cranial Nerves: No cranial nerve deficit. Final Path: Diagnosis:   Left neck mass, excision:   Benign cyst, with morphologic findings compatible with branchial cleft   cyst.     IMPRESSION/PLAN:    Patient here 6 week post op s/p left neck mass excision. Path consistent with brachial cleft cyst. Recommend patient begin using scar cream to help soften the scar. Patient may return to regular activity. No further surgical management at this time. Patient may follow up as needed. Electronically signed by Karen Gonzalez DO on 2/14/2022 at 7:34 AM            Cheryl Benitez  1975      I have discussed the case, including pertinent history and exam findings with the resident. I have seen and examined the patient and the key elements of the encounter have been performed by me. I agree with the assessment, plan and orders as documented by the resident. Patient here for follow up of medical problems. Remainder of medical problems as per resident note.       Miguel Murillo DO  2/22/22

## (undated) DEVICE — MARKER,SKIN,WI/RULER AND LABELS: Brand: MEDLINE

## (undated) DEVICE — COVER HNDL LT DISP

## (undated) DEVICE — DOUBLE BASIN SET: Brand: MEDLINE INDUSTRIES, INC.

## (undated) DEVICE — GLOVE ORANGE PI 7   MSG9070

## (undated) DEVICE — RETRACTOR WEITLANER BLUNT

## (undated) DEVICE — SET SURG BASIN MAYO REUSABLE

## (undated) DEVICE — 4-PORT MANIFOLD: Brand: NEPTUNE 2

## (undated) DEVICE — CORD,CAUTERY,BIPOLAR,STERILE: Brand: MEDLINE

## (undated) DEVICE — MAGNETIC INSTR DRAPE 20X16: Brand: MEDLINE INDUSTRIES, INC.

## (undated) DEVICE — 3M™ TEGADERM™ TRANSPARENT FILM DRESSING FRAME STYLE, 1626W, 4 IN X 4-3/4 IN (10 CM X 12 CM), 50/CT 4CT/CASE: Brand: 3M™ TEGADERM™

## (undated) DEVICE — INTENDED FOR TISSUE SEPARATION, AND OTHER PROCEDURES THAT REQUIRE A SHARP SURGICAL BLADE TO PUNCTURE OR CUT.: Brand: BARD-PARKER ® STAINLESS STEEL BLADES

## (undated) DEVICE — ELECTRODE PT RET AD L9FT HI MOIST COND ADH HYDRGEL CORDED

## (undated) DEVICE — TOWEL,OR,DSP,ST,BLUE,STD,6/PK,12PK/CS: Brand: MEDLINE

## (undated) DEVICE — NEEDLE HYPO 25GA L1.5IN BLU POLYPR HUB S STL REG BVL STR

## (undated) DEVICE — ELECTRODE ELECSURG NDL 2.8 INX7.2 CM COAT INSUL EDGE

## (undated) DEVICE — SURGICAL PROCEDURE PACK EENT CUST

## (undated) DEVICE — MASTISOL ADHESIVE LIQ 2/3ML

## (undated) DEVICE — GAUZE,SPONGE,4"X4",8PLY,STRL,LF,10/TRAY: Brand: MEDLINE

## (undated) DEVICE — STRIP,CLOSURE,WOUND,MEDI-STRIP,1/2X4: Brand: MEDLINE

## (undated) DEVICE — PAD,NON-ADHERENT,2X3,STERILE,LF,1/PK: Brand: MEDLINE

## (undated) DEVICE — SPONGE,LAP,4"X18",XR,ST,5/PK,40PK/CS: Brand: MEDLINE INDUSTRIES, INC.

## (undated) DEVICE — CONTROL SYRINGE LUER-LOCK TIP: Brand: MONOJECT

## (undated) DEVICE — PACK PROCEDURE SURG GEN CUST

## (undated) DEVICE — KIT SURG PREP POVIDONE IOD PRESATURATED PAINT WET FOR UNIV

## (undated) DEVICE — SOLUTION IV IRRIG POUR BRL 0.9% SODIUM CHL 2F7124

## (undated) DEVICE — TUBING SUCT 12FR MAL ALUM SHFT FN CAP VENT UNIV CONN W/ OBT

## (undated) DEVICE — STICK SPONGE PRESAT PVP PAINT STERILE

## (undated) DEVICE — GLOVE ORANGE PI 7 1/2   MSG9075

## (undated) DEVICE — Device